# Patient Record
Sex: FEMALE | URBAN - METROPOLITAN AREA
[De-identification: names, ages, dates, MRNs, and addresses within clinical notes are randomized per-mention and may not be internally consistent; named-entity substitution may affect disease eponyms.]

---

## 2018-01-16 ENCOUNTER — IMPORTED ENCOUNTER (OUTPATIENT)
Dept: URBAN - METROPOLITAN AREA CLINIC 1 | Facility: CLINIC | Age: 23
End: 2018-01-16

## 2018-01-16 PROBLEM — H52.13: Noted: 2018-01-16

## 2018-01-16 NOTE — PATIENT DISCUSSION
Lasik Consult -Myopia- Pt is a good candidate for Lasik or [facility] OU. Difference between [facility] and Lasik discussed w/ pt. Pt desires Lasik. Ok to proceed with laser [facility] whenever pt desires.

## 2018-02-02 ENCOUNTER — IMPORTED ENCOUNTER (OUTPATIENT)
Dept: URBAN - METROPOLITAN AREA CLINIC 1 | Facility: CLINIC | Age: 23
End: 2018-02-02

## 2018-02-03 ENCOUNTER — IMPORTED ENCOUNTER (OUTPATIENT)
Dept: URBAN - METROPOLITAN AREA CLINIC 1 | Facility: CLINIC | Age: 23
End: 2018-02-03

## 2018-02-03 PROBLEM — Z98.89: Noted: 2018-02-03

## 2018-02-03 NOTE — PATIENT DISCUSSION
1. POD #1 LASIK OU - BCL removed OU today. Duezol BID OUOcuflox BID OUTears QID OU may use PF ATs Q1-2H OU Return for an appointment for Return as scheduled with Dr. Christopher Lester.

## 2018-02-09 ENCOUNTER — IMPORTED ENCOUNTER (OUTPATIENT)
Dept: URBAN - METROPOLITAN AREA CLINIC 1 | Facility: CLINIC | Age: 23
End: 2018-02-09

## 2018-02-09 PROBLEM — Z98.89: Noted: 2018-02-09

## 2018-04-02 ENCOUNTER — IMPORTED ENCOUNTER (OUTPATIENT)
Dept: URBAN - METROPOLITAN AREA CLINIC 1 | Facility: CLINIC | Age: 23
End: 2018-04-02

## 2018-04-02 PROBLEM — Z98.89: Noted: 2018-04-02

## 2018-04-02 NOTE — PATIENT DISCUSSION
1. POM #2 LASIK OU - doing well. Continue Tears QID Routinely OUReturn for an appointment in 3 weeks LASIK PO with Dr. Dion Jc.

## 2018-04-23 ENCOUNTER — IMPORTED ENCOUNTER (OUTPATIENT)
Dept: URBAN - METROPOLITAN AREA CLINIC 1 | Facility: CLINIC | Age: 23
End: 2018-04-23

## 2018-04-23 PROBLEM — Z98.89: Noted: 2018-04-23

## 2018-04-23 NOTE — PATIENT DISCUSSION
POM #2 LASIK OU - doing well Cont ATs QID OU Routinely Cont Alloway BID OU PRN for allergies. Return for an appointment in 1 yr 27 with Dr. Mitchel Donnelly.

## 2020-01-16 NOTE — PATIENT DISCUSSION
POW #1 LASIK OU - doing well [facility] Excellent. Durezol BID OU till out then d/c. D/c Ocuflox BID OUCont ATs QID OUReturn for an appointment in 3-4 week LASIK PO with Dr. Noah Resendez. [phone]

## 2022-04-02 ASSESSMENT — KERATOMETRY
OS_K2POWER_DIOPTERS: 42.75
OD_K2POWER_DIOPTERS: 42.50
OS_K1POWER_DIOPTERS: 42.50
OD_K1POWER_DIOPTERS: 42.25
OD_AXISANGLE2_DEGREES: 120
OS_AXISANGLE2_DEGREES: 089

## 2022-04-02 ASSESSMENT — VISUAL ACUITY
OD_CC: 20/400
OD_CC: 20/20
OD_SC: 20/20-2
OD_CC: J1+
OS_CC: J1+
OS_CC: 20/200
OS_SC: 20/20
OD_CC: 20/20
OS_CC: 20/20
OS_CC: 20/20
OS_SC: 20/20-1
OD_SC: 20/20

## 2022-04-02 ASSESSMENT — TONOMETRY
OD_IOP_MMHG: 14
OS_IOP_MMHG: 12
OS_IOP_MMHG: 14
OD_IOP_MMHG: 12
OS_IOP_MMHG: 15
OD_IOP_MMHG: 14
OS_IOP_MMHG: 14
OD_IOP_MMHG: 15